# Patient Record
Sex: MALE | Race: BLACK OR AFRICAN AMERICAN | NOT HISPANIC OR LATINO | URBAN - METROPOLITAN AREA
[De-identification: names, ages, dates, MRNs, and addresses within clinical notes are randomized per-mention and may not be internally consistent; named-entity substitution may affect disease eponyms.]

---

## 2019-07-22 ENCOUNTER — EMERGENCY (EMERGENCY)
Facility: HOSPITAL | Age: 34
LOS: 1 days | Discharge: HOME | End: 2019-07-22
Admitting: EMERGENCY MEDICINE
Payer: COMMERCIAL

## 2019-07-22 VITALS
SYSTOLIC BLOOD PRESSURE: 149 MMHG | TEMPERATURE: 99 F | RESPIRATION RATE: 16 BRPM | DIASTOLIC BLOOD PRESSURE: 92 MMHG | OXYGEN SATURATION: 100 % | HEART RATE: 78 BPM

## 2019-07-22 DIAGNOSIS — Z98.89 OTHER SPECIFIED POSTPROCEDURAL STATES: Chronic | ICD-10-CM

## 2019-07-22 PROCEDURE — 99283 EMERGENCY DEPT VISIT LOW MDM: CPT

## 2019-07-22 RX ORDER — OFLOXACIN 0.3 %
1 DROPS OPHTHALMIC (EYE)
Qty: 10 | Refills: 0
Start: 2019-07-22 | End: 2019-07-31

## 2019-07-22 NOTE — ED PROVIDER NOTE - EYE, RIGHT
redness and swelling noted around the right eye, no obvious foreign body noted./CONJUNCTIVA ERYTHEMA

## 2019-07-22 NOTE — CONSULT NOTE ADULT - SUBJECTIVE AND OBJECTIVE BOX
[facility] Ophthalmology Consult Note    HPI: 35 YO M with no PMH coming in with sawdust in eye. 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 He continued to work and finished his shift, and came into the ED as his eyes were red and had foreign body sensation. Had some stinging/needle like sensation in the eye. No visual complaints.       PMH: None  Meds: None  POcHx (including surgeries/lasers/trauma):  None  Drops: None  FamHx: None  Social Hx: None  Allergies: Peanuts    ROS:  General (neg), Vision (per HPI), Head and Neck (neg), Pulm (neg), CV (neg), GI (neg),  (neg), Musculoskeletal (neg), Skin/Integ (neg), Neuro (neg), Endocrine (neg), Heme (neg), All/Immuno (neg)    Mood and Affect Appropriate ( x ),  Oriented to Time, Place, and Person x 3 ( x )    Ophthalmology Exam    Visual acuity (sc): 20/20 OU  Pupils: PERRL OU, no APD  Ttono: 18, 16  Extraocular movements (EOMs): Full OU, no pain, no diplopia  Confrontational Visual Field (CVF):  Full OU  Color Plates: 6/12 OU    Slit Lamp Exam (SLE)  External:  squinting OD  Lids/Lashes/Lacrimal Ducts: Flat OU    Sclera/Conjunctiva: 1+ injection OD, W+Q OS, Superior eyelid flipped, OD w wood piece (0.5mm) on palpebral conjunctiva  Cornea: 3.5x1.5mm epithelial defect superiorly at 12 clock hour position OD, Cl OS   Anterior Chamber: D+Q OU   Iris:  Flat OU  Lens:  Cl OU    Fundus Exam: dilated with 1% tropicamide and 2.5% phenylephrine  Approval obtained from primary team and patient for dilation  Patient aware that pupils can remained dilated for at least 4-6 hours  Exam performed with 20D lens    Vitreous: wnl OU  Disc, cup/disc: sharp and pink, 0.2 OU  Macula:  wnl OU  Vessels:  wnl OU  Periphery: wnl OU        Assessment: 35 YO M w no PMH coming in with workplace sawdust injury, with superior epithelial defect and conjunctival foreign body      Plan:   Corneal Abrasion  - s/p removal of wood piece from upper palpebral conjunctiva   Ofloxacin QID OD  Lacrilube/E-ointment Qhs OD  Artificial tears 3-4 x /day OD   Instructed to use goggles that have more protection so that the sawdust doesn't get under the goggles.   Educated on importance of follow up to make sure the abrasion is healing and the usage of the drops.   Patient instructed to follow up at [address] [place] [address] on Friday at 4PM. Address and Phone number given.      Follow-Up:  Patient should follow up his/her ophthalmologist or in the [facility] Practice on Friday at 4PM.  [address] [place] [address].  [place] [address] [phone]    seen and discussed with  (The Chief Resident) [facility] Ophthalmology Consult Note    HPI: 35 YO M with no PMH coming in with sawdust in eye. 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 He continued to work and finished his shift, and came into the ED as his eyes were red and had foreign body sensation. Had some stinging/needle like sensation in the eye. No visual complaints, no flashes/floaters, no photophobia.      PMH: None  Meds: None  POcHx (including surgeries/lasers/trauma):  None  Drops: None  FamHx: None  Social Hx: None  Allergies: Peanuts    ROS:  General (neg), Vision (per HPI), Head and Neck (neg), Pulm (neg), CV (neg), GI (neg),  (neg), Musculoskeletal (neg), Skin/Integ (neg), Neuro (neg), Endocrine (neg), Heme (neg), All/Immuno (neg)    Mood and Affect Appropriate ( x ),  Oriented to Time, Place, and Person x 3 ( x )    Ophthalmology Exam    Visual acuity (sc): 20/20 OU   Pupils: PERRL OU, no APD  Ttono: 18, 16  Extraocular movements (EOMs): Full OU, no pain, no diplopia  Confrontational Visual Field (CVF):  Full OU  Color Plates: 5/12 OU (Patient states that he was told he's colorblind)    Slit Lamp Exam (SLE)  Lids/Lashes/Lacrimal Ducts: Flat OU    Sclera/Conjunctiva: 1+ injection OD, W+Q OS, Superior eyelid flipped, OD w wood piece (0.5mm) on upper palpebral conjunctiva  Cornea: 3.5x1.5mm epithelial defect superiorly at 12 clock hour position OD, Cl OS   Anterior Chamber: D+Q OU   Iris:  Flat OU  Lens:  Cl OU    Fundus Exam: dilated with 1% tropicamide and 2.5% phenylephrine  Approval obtained from primary team and patient for dilation  Patient aware that pupils can remained dilated for at least 4-6 hours  Exam performed with 20D lens    Vitreous: wnl OU  Disc, cup/disc: sharp and pink, 0.2 OU  Macula:  wnl OU  Vessels:  wnl OU  Periphery: wnl OU        Assessment: 35 YO M w no PMH coming in with wood sawdust injury, with superior corneal epithelial defect and conjunctival foreign body      Plan:   Corneal Abrasion  - s/p removal of wood piece from upper palpebral conjunctiva   Ofloxacin QID OD  Lacrilube/E-ointment Qhs OD  Artificial tears 3-4 x /day OD   Instructed to use goggles that have more protection so that the sawdust doesn't get under the goggles.   Educated on importance of follow up to make sure the abrasion is healing and the usage of the drops.   Patient instructed to follow up at [address] [place] [address] on Friday at 4PM. Address and Phone number given.      Follow-Up:  Patient should follow up his/her ophthalmologist or in the [facility] Practice on Friday at 4PM.  [address] [place] [address].  [place] [address] [phone]    seen and discussed with  (The Chief Resident)

## 2019-07-22 NOTE — ED ADULT TRIAGE NOTE - CHIEF COMPLAINT QUOTE
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 patient used protective goggles " tearing from right eye noted.

## 2019-07-22 NOTE — CONSULT NOTE ADULT - ATTENDING COMMENTS
Patient needs to be seen with [facility] Ophthalmology clinic within 1 week of discharge.

## 2019-07-22 NOTE — ED PROVIDER NOTE - OBJECTIVE STATEMENT
34 y.o male with no known PMHx presents to the ED complaining of having a a foreign body sensation in his eye. 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 Patient state that he was not wearing goggles. patient states that he does not wear any contacts or glasses. Pt describes it as a burning sensation in the eye, admits to having partially blurry vision. Pt denies having any headaches, dizziness, CP, SOB, william, abdominal pain, chest pain, fever, or chills.

## 2019-07-26 ENCOUNTER — APPOINTMENT (OUTPATIENT)
Dept: OPHTHALMOLOGY | Facility: CLINIC | Age: 34
End: 2019-07-26

## 2021-02-17 ENCOUNTER — TRANSCRIPTION ENCOUNTER (OUTPATIENT)
Age: 36
End: 2021-02-17

## 2022-01-01 ENCOUNTER — OUTPATIENT (OUTPATIENT)
Dept: OUTPATIENT SERVICES | Facility: HOSPITAL | Age: 37
LOS: 1 days | End: 2022-01-01
Payer: COMMERCIAL

## 2022-01-01 DIAGNOSIS — Z11.52 ENCOUNTER FOR SCREENING FOR COVID-19: ICD-10-CM

## 2022-01-01 DIAGNOSIS — Z98.89 OTHER SPECIFIED POSTPROCEDURAL STATES: Chronic | ICD-10-CM

## 2022-01-01 LAB — SARS-COV-2 RNA SPEC QL NAA+PROBE: SIGNIFICANT CHANGE UP

## 2022-01-01 PROCEDURE — U0003: CPT

## 2022-01-01 PROCEDURE — C9803: CPT

## 2022-01-01 PROCEDURE — U0005: CPT
